# Patient Record
Sex: MALE | Race: WHITE | NOT HISPANIC OR LATINO | URBAN - METROPOLITAN AREA
[De-identification: names, ages, dates, MRNs, and addresses within clinical notes are randomized per-mention and may not be internally consistent; named-entity substitution may affect disease eponyms.]

---

## 2017-08-15 ENCOUNTER — APPOINTMENT (OUTPATIENT)
Dept: URBAN - METROPOLITAN AREA CLINIC 200 | Age: 42
Setting detail: DERMATOLOGY
End: 2017-08-15

## 2017-08-15 DIAGNOSIS — L0391 CELLULITIS AND ABSCESS OF UNSPECIFIED SITES: ICD-10-CM

## 2017-08-15 DIAGNOSIS — L0390 CELLULITIS AND ABSCESS OF UNSPECIFIED SITES: ICD-10-CM

## 2017-08-15 PROBLEM — L02.414 CUTANEOUS ABSCESS OF LEFT UPPER LIMB: Status: ACTIVE | Noted: 2017-08-15

## 2017-08-15 PROCEDURE — 10060 I&D ABSCESS SIMPLE/SINGLE: CPT

## 2017-08-15 PROCEDURE — OTHER INCISION AND DRAINAGE: OTHER

## 2017-08-15 PROCEDURE — OTHER ORDER TESTS: OTHER

## 2017-08-15 ASSESSMENT — LOCATION ZONE DERM: LOCATION ZONE: ARM

## 2017-08-15 ASSESSMENT — LOCATION DETAILED DESCRIPTION DERM: LOCATION DETAILED: LEFT VENTRAL PROXIMAL FOREARM

## 2017-08-15 ASSESSMENT — LOCATION SIMPLE DESCRIPTION DERM: LOCATION SIMPLE: LEFT FOREARM

## 2017-08-15 NOTE — PROCEDURE: INCISION AND DRAINAGE
Curette: No
Dressing: dry sterile dressing
Detail Level: Detailed
Suture Text: The incision was partially closed with
Anesthesia Volume In Cc: 0
Preparation Text: The area was prepped in the usual clean fashion.
Epidermal Closure: simple interrupted
Post-Care Instructions: I reviewed with the patient in detail post-care instructions. Patient should keep wound covered and call the office should any redness, pain, swelling or worsening occur.
Method: 15 blade
Consent was obtained and risks were reviewed including but not limited to delayed wound healing, infection, need for multiple I and D's, and pain.
Epidermal Sutures: 4-0 Ethilon
Lesion Type: Cyst

## 2017-08-17 ENCOUNTER — RX ONLY (RX ONLY)
Age: 42
End: 2017-08-17

## 2017-08-17 RX ORDER — DOXYCYCLINE HYCLATE 100 MG/1
CAPSULE, GELATIN COATED ORAL
Qty: 14 | Refills: 0 | Status: ERX | COMMUNITY
Start: 2017-08-17

## 2017-08-25 ENCOUNTER — RX ONLY (RX ONLY)
Age: 42
End: 2017-08-25

## 2017-08-25 RX ORDER — MUPIROCIN 20 MG/G
OINTMENT TOPICAL
Qty: 1 | Refills: 3 | Status: ERX | COMMUNITY
Start: 2017-08-25

## 2017-08-25 RX ORDER — CHLORHEXIDINE GLUCONATE 213 G/1000ML
SOLUTION TOPICAL
Qty: 1 | Refills: 2 | Status: ERX | COMMUNITY
Start: 2017-08-25

## 2018-01-22 ENCOUNTER — APPOINTMENT (OUTPATIENT)
Dept: URBAN - METROPOLITAN AREA CLINIC 200 | Age: 43
Setting detail: DERMATOLOGY
End: 2018-01-22

## 2018-01-22 DIAGNOSIS — L73.8 OTHER SPECIFIED FOLLICULAR DISORDERS: ICD-10-CM

## 2018-01-22 PROBLEM — D23.9 OTHER BENIGN NEOPLASM OF SKIN, UNSPECIFIED: Status: ACTIVE | Noted: 2018-01-22

## 2018-01-22 PROBLEM — D23.5 OTHER BENIGN NEOPLASM OF SKIN OF TRUNK: Status: ACTIVE | Noted: 2018-01-22

## 2018-01-22 PROCEDURE — OTHER MIPS QUALITY: OTHER

## 2018-01-22 PROCEDURE — OTHER REASSURANCE: OTHER

## 2018-01-22 PROCEDURE — OTHER RECOMMENDATIONS: OTHER

## 2018-01-22 PROCEDURE — 99213 OFFICE O/P EST LOW 20 MIN: CPT

## 2018-01-22 ASSESSMENT — LOCATION ZONE DERM: LOCATION ZONE: FACE

## 2018-01-22 ASSESSMENT — LOCATION DETAILED DESCRIPTION DERM: LOCATION DETAILED: LEFT INFERIOR CENTRAL MALAR CHEEK

## 2018-01-22 ASSESSMENT — LOCATION SIMPLE DESCRIPTION DERM: LOCATION SIMPLE: LEFT CHEEK

## 2018-01-22 NOTE — PROCEDURE: RECOMMENDATIONS
Recommendation Preamble: The following recommendations were made during the visit:
Detail Level: Zone
Recommendations (Free Text): Benzoyl peroxide panoxyl 4-5%

## 2018-09-26 ENCOUNTER — HOSPITAL ENCOUNTER (EMERGENCY)
Facility: HOSPITAL | Age: 43
Discharge: HOME | End: 2018-09-26
Attending: EMERGENCY MEDICINE
Payer: COMMERCIAL

## 2018-09-26 ENCOUNTER — APPOINTMENT (EMERGENCY)
Dept: RADIOLOGY | Facility: HOSPITAL | Age: 43
End: 2018-09-26
Attending: EMERGENCY MEDICINE
Payer: COMMERCIAL

## 2018-09-26 VITALS
HEART RATE: 73 BPM | RESPIRATION RATE: 18 BRPM | OXYGEN SATURATION: 99 % | TEMPERATURE: 99.9 F | HEIGHT: 70 IN | SYSTOLIC BLOOD PRESSURE: 102 MMHG | WEIGHT: 165 LBS | BODY MASS INDEX: 23.62 KG/M2 | DIASTOLIC BLOOD PRESSURE: 68 MMHG

## 2018-09-26 DIAGNOSIS — R94.31 PROLONGED Q-T INTERVAL ON ECG: ICD-10-CM

## 2018-09-26 DIAGNOSIS — R55 VASOVAGAL SYNCOPE: Primary | ICD-10-CM

## 2018-09-26 LAB
ALBUMIN SERPL-MCNC: 4.6 G/DL (ref 3.4–5)
ALP SERPL-CCNC: 42 IU/L (ref 35–126)
ALT SERPL-CCNC: 19 IU/L (ref 16–63)
ANION GAP SERPL CALC-SCNC: 12 MEQ/L (ref 3–15)
AST SERPL-CCNC: 28 IU/L (ref 15–41)
BASOPHILS # BLD: 0.03 K/UL (ref 0.01–0.1)
BASOPHILS NFR BLD: 0.2 %
BILIRUB SERPL-MCNC: 1.5 MG/DL (ref 0.3–1.2)
BUN SERPL-MCNC: 13 MG/DL (ref 8–20)
CALCIUM SERPL-MCNC: 8.5 MG/DL (ref 8.9–10.3)
CHLORIDE SERPL-SCNC: 104 MEQ/L (ref 98–109)
CO2 SERPL-SCNC: 19 MEQ/L (ref 22–32)
CREAT SERPL-MCNC: 0.8 MG/DL (ref 0.8–1.3)
DIFFERENTIAL METHOD BLD: ABNORMAL
EOSINOPHIL # BLD: 0.18 K/UL (ref 0.04–0.54)
EOSINOPHIL NFR BLD: 1.5 %
ERYTHROCYTE [DISTWIDTH] IN BLOOD BY AUTOMATED COUNT: 12.1 % (ref 11.6–14.4)
GFR SERPL CREATININE-BSD FRML MDRD: >60 ML/MIN/1.73M*2
GLUCOSE SERPL-MCNC: 146 MG/DL (ref 70–99)
HCT VFR BLDCO AUTO: 38.9 % (ref 40.1–51)
HGB BLD-MCNC: 13.7 G/DL (ref 13.7–17.5)
IMM GRANULOCYTES # BLD AUTO: 0.05 K/UL (ref 0–0.08)
IMM GRANULOCYTES NFR BLD AUTO: 0.4 %
LIPASE SERPL-CCNC: 24 U/L (ref 20–51)
LYMPHOCYTES # BLD: 2.56 K/UL (ref 1.2–3.5)
LYMPHOCYTES NFR BLD: 20.7 %
MCH RBC QN AUTO: 30.3 PG (ref 28–33.2)
MCHC RBC AUTO-ENTMCNC: 35.2 G/DL (ref 32.2–36.5)
MCV RBC AUTO: 86.1 FL (ref 83–98)
MONOCYTES # BLD: 1.1 K/UL (ref 0.3–1)
MONOCYTES NFR BLD: 8.9 %
NEUTROPHILS # BLD: 8.45 K/UL (ref 1.7–7)
NEUTS SEG NFR BLD: 68.3 %
NRBC BLD-RTO: 0 %
PDW BLD AUTO: 11.2 FL (ref 9.4–12.4)
PLATELET # BLD AUTO: 212 K/UL (ref 150–350)
POTASSIUM SERPL-SCNC: 3.1 MEQ/L (ref 3.6–5.1)
PROT SERPL-MCNC: 6.7 G/DL (ref 6–8.2)
RBC # BLD AUTO: 4.52 M/UL (ref 4.5–5.8)
SODIUM SERPL-SCNC: 135 MEQ/L (ref 136–144)
TROPONIN I SERPL-MCNC: <0.02 NG/ML
WBC # BLD AUTO: 12.37 K/UL (ref 3.8–10.5)

## 2018-09-26 PROCEDURE — 84484 ASSAY OF TROPONIN QUANT: CPT | Performed by: EMERGENCY MEDICINE

## 2018-09-26 PROCEDURE — 99284 EMERGENCY DEPT VISIT MOD MDM: CPT | Mod: 25

## 2018-09-26 PROCEDURE — 85025 COMPLETE CBC W/AUTO DIFF WBC: CPT | Performed by: EMERGENCY MEDICINE

## 2018-09-26 PROCEDURE — 70450 CT HEAD/BRAIN W/O DYE: CPT

## 2018-09-26 PROCEDURE — 83690 ASSAY OF LIPASE: CPT | Performed by: EMERGENCY MEDICINE

## 2018-09-26 PROCEDURE — 93005 ELECTROCARDIOGRAM TRACING: CPT | Performed by: EMERGENCY MEDICINE

## 2018-09-26 PROCEDURE — 80053 COMPREHEN METABOLIC PANEL: CPT | Performed by: EMERGENCY MEDICINE

## 2018-09-26 PROCEDURE — 36415 COLL VENOUS BLD VENIPUNCTURE: CPT | Performed by: EMERGENCY MEDICINE

## 2018-09-26 PROCEDURE — 71045 X-RAY EXAM CHEST 1 VIEW: CPT

## 2018-09-26 ASSESSMENT — ENCOUNTER SYMPTOMS
FEVER: 0
SHORTNESS OF BREATH: 0
SEIZURES: 0
ABDOMINAL PAIN: 0
COUGH: 0
SORE THROAT: 0
BACK PAIN: 0
COLOR CHANGE: 0
EYE PAIN: 0
HEMATURIA: 0
ARTHRALGIAS: 0
DYSURIA: 0
NAUSEA: 1
VOMITING: 1
CHILLS: 0
PALPITATIONS: 0

## 2018-09-27 LAB
ATRIAL RATE: 78
P AXIS: 69
PR INTERVAL: 140
QRS DURATION: 88
QT INTERVAL: 424
QTC CALCULATION(BAZETT): 483
R AXIS: 65
RAINBOW HOLD SPECIMEN: NORMAL
T WAVE AXIS: 31
VENTRICULAR RATE: 78

## 2018-09-27 NOTE — ED PROVIDER NOTES
HPI     Chief Complaint   Patient presents with   • Syncope       HPI  43-year-old male with history of anxiety presenting with syncope.    States that prior to arrival he got emotional news from his father and started to feel sick.  He went outside thinking he was going to throw up and then remembers waking up on the ground looking up.  States he fell onto the grass might have hit a post.  After he woke up he started vomiting multiple times.  Currently states he is feeling a little better but has mild abdominal pain because of the vomiting and mild left neck soreness.  Denies family history of sudden cardiac death.  Notes that he did have a syncopal episode approximately 12 years ago after experiencing intense back pain.  Denies headache, chest pain, shortness of breath.  Given 8 mg of Zofran with EMS.   Patient History     Past Medical History:   Diagnosis Date   • Anxiety    • Depression        History reviewed. No pertinent surgical history.    History reviewed. No pertinent family history.    Social History   Substance Use Topics   • Smoking status: Never Smoker   • Smokeless tobacco: Not on file   • Alcohol use Yes      Comment: occasional       Systems Reviewed from Nursing Triage:          Review of Systems     Review of Systems   Constitutional: Negative for chills and fever.   HENT: Negative for ear pain and sore throat.    Eyes: Negative for pain and visual disturbance.   Respiratory: Negative for cough and shortness of breath.    Cardiovascular: Negative for chest pain and palpitations.   Gastrointestinal: Positive for nausea and vomiting. Negative for abdominal pain.   Genitourinary: Negative for dysuria and hematuria.   Musculoskeletal: Negative for arthralgias and back pain.   Skin: Negative for color change and rash.   Neurological: Positive for syncope. Negative for seizures.   All other systems reviewed and are negative.       Physical Exam     ED Triage Vitals   Temp Heart Rate Resp BP SpO2  "  09/26/18 1946 09/26/18 1946 09/26/18 1946 09/26/18 1946 09/26/18 1946   36.8 °C (98.2 °F) 68 18 (!) 147/86 100 %      Temp Source Heart Rate Source Patient Position BP Location FiO2 (%) (Set)   09/26/18 1946 -- 09/26/18 2058 09/26/18 2058 --   Temporal  Lying Right upper arm                      Patient Vitals for the past 24 hrs:   BP Temp Temp src Pulse Resp SpO2 Height Weight   09/26/18 2159 102/68 37.7 °C (99.9 °F) Temporal 73 18 99 % - -   09/26/18 2058 116/70 - - 70 16 100 % - -   09/26/18 1946 (!) 147/86 36.8 °C (98.2 °F) Temporal 68 18 100 % 1.778 m (5' 10\") 74.8 kg (165 lb)           Physical Exam   Constitutional: He appears well-developed and well-nourished.   HENT:   Head: Normocephalic and atraumatic.   Eyes: Conjunctivae are normal.   Neck: Normal range of motion. Neck supple.   Mild left sided cervical ttp. No midline spinal tenderness.    Cardiovascular: Normal rate and regular rhythm.    No murmur heard.  Pulmonary/Chest: Effort normal and breath sounds normal. No respiratory distress.   Abdominal: Soft. There is tenderness.   Musculoskeletal: He exhibits no edema.   Neurological: He is alert.   Skin: Skin is warm and dry.   Psychiatric:   Odd affect   Nursing note and vitals reviewed.           Procedures    ED Course & MDM     Labs Reviewed   COMPREHENSIVE METABOLIC PANEL - Abnormal        Result Value    Sodium 135 (*)     Potassium 3.1 (*)     Chloride 104      CO2 19 (*)     BUN 13      Creatinine 0.8      Glucose 146 (*)     Calcium 8.5 (*)     AST (SGOT) 28      ALT (SGPT) 19      Alkaline Phosphatase 42      Total Protein 6.7      Albumin 4.6      Bilirubin, Total 1.5 (*)     eGFR >60.0      Anion Gap 12      Narrative:     Order only if pain is above umbilicus   CBC - Abnormal     WBC 12.37 (*)     RBC 4.52      Hemoglobin 13.7      Hematocrit 38.9 (*)     MCV 86.1      MCH 30.3      MCHC 35.2      RDW 12.1      Platelets 212      MPV 11.2     DIFF COUNT - Abnormal     Differential Type " Auto      nRBC 0.0      Immature Granulocytes 0.4      Neutrophils 68.3      Lymphocytes 20.7      Monocytes 8.9      Eosinophils 1.5      Basophils 0.2      Immature Granulocytes, Absolute 0.05      Neutrophils, Absolute 8.45 (*)     Lymphocytes, Absolute 2.56      Monocytes, Absolute 1.10 (*)     Eosinophils, Absolute 0.18      Basophils, Absolute 0.03     TROPONIN I - Normal    Troponin I <0.02     LIPASE - Normal    Lipase 24      Narrative:     Order only if pain is above umbilicus   CBC AND DIFFERENTIAL    Narrative:     The following orders were created for panel order CBC and differential.  Procedure                               Abnormality         Status                     ---------                               -----------         ------                     CBC[[id number]]                           Abnormal            Final result               Diff Count[[id number]]                    Abnormal            Final result                 Please view results for these tests on the individual orders.   RAINBOW DRAW PANEL    Narrative:     The following orders were created for panel order [facility] Draw Panel.  Procedure                               Abnormality         Status                     ---------                               -----------         ------                     RAINBOW RED[[id number]]                                       In process                 RAINBOW LT BLUE[[id number]]                                   In process                 RAINBOW GOLD[[id number]]                                      In process                   Please view results for these tests on the individual orders.   RAINBOW RED   RAINBOW LT BLUE   RAINBOW GOLD       CT HEAD WITHOUT IV CONTRAST   Final Result   IMPRESSION: No acute hemorrhage. No acute infarction in a major vascular   territory. No mass or mass effect.                  ECG 12 lead    (Results Pending)   X-RAY CHEST 1 VIEW    (Results Pending)            MDM  Syncope.  Sounds vasovagal based on story.  No midline spinal tenderness.  Even odd affect will CT head but low suspicion for intracranial process.  Labs ordered by intake nurse.  ECG without MO prolongation.  Mild QTC prolongation but given large dose of Zofran with EMS.  Will rehydrate, check labs, CT head, and reevaluate.         ED Course as of Sep 26 2207   Wed Sep 26, 2018   2121 IMPRESSION: No acute hemorrhage. No acute infarction in a major vascular  territory. No mass or mass effect. CT HEAD WITHOUT IV CONTRAST [JK]   2128 Lipase: 24 [JK]   2129 Troponin I: <0.02 [JK]   2206 Pt feeling better after fluids.  CXR with no acute disease.  Will d/c home with PCP f/u of prolonged QTc.   [JK]      ED Course User Index  [JK] Vel Byrd MD         Clinical Impressions as of Sep 26 2207   Vasovagal syncope   Prolonged Q-T interval on ECG        Vel Byrd MD  Resident  09/26/18 2207

## 2018-09-27 NOTE — DISCHARGE INSTRUCTIONS
Return if worsening or concerning symptoms.  Be sure to drink plenty of water during the day.  Please follow-up with your primary care physician.  Also, your QTc was 483 (your primary care doctor will know what this means). Please get a repeat ECG when you see your primary care doctor.

## 2018-09-27 NOTE — ED ATTESTATION NOTE
I saw and evaluated the patient.  I reviewed the resident's note and agree with the findings and plan as documented in the note except for my comments if noted below.    C/o syncope just pta. Pt says he was in his usual state of health, he got some bad news from his father and suddenly felt nauseated, and dizzy. Walked outside and passed out. Fell approx 2 steps off his deck. He thinks he might have passed out for 5 seconds. He has some mild left sided neck pain and mild left ear pain. He vomited several times after passing out with mild abd cramping. There was no assoc cp, sob, cough, headache, diarrhea, focal weakness, leg pain or swelling. He denies chronic medical problems. No daily meds except magnesium. No smoking or heavy etoh. He does smoke marijuana. No recent travel or surgeyr. No h/o mi, pe or dvt.     PE - NAD  NCAT  Neck - no swelling, no midline ttp. Full rom with central pain. Mild lateral pain on rom. No bruit  Cards - rrr no m/r/g  Lungs - cta bilat, symmetric, nonlabored  abd - soft nontender.   Ext - no deformity or ttp, neg homans.   Neuro - nonfocal     A/P - likely vasovagal syncope. No major signs of injury. ekg with slightly prolonged qtc but < 500.           I was physically present for the key/critical portions of the following procedures: None     Bjorn Babb MD  09/26/18 6044

## 2019-02-08 ENCOUNTER — APPOINTMENT (OUTPATIENT)
Dept: URBAN - METROPOLITAN AREA CLINIC 200 | Age: 44
Setting detail: DERMATOLOGY
End: 2019-02-14

## 2019-02-08 DIAGNOSIS — L57.8 OTHER SKIN CHANGES DUE TO CHRONIC EXPOSURE TO NONIONIZING RADIATION: ICD-10-CM

## 2019-02-08 PROBLEM — D23.5 OTHER BENIGN NEOPLASM OF SKIN OF TRUNK: Status: ACTIVE | Noted: 2019-02-08

## 2019-02-08 PROCEDURE — OTHER MIPS QUALITY: OTHER

## 2019-02-08 PROCEDURE — 99213 OFFICE O/P EST LOW 20 MIN: CPT

## 2019-02-08 PROCEDURE — OTHER COUNSELING: OTHER

## 2019-02-08 ASSESSMENT — LOCATION ZONE DERM: LOCATION ZONE: TRUNK

## 2019-02-08 ASSESSMENT — LOCATION DETAILED DESCRIPTION DERM: LOCATION DETAILED: LEFT MEDIAL SUPERIOR CHEST

## 2019-02-08 ASSESSMENT — LOCATION SIMPLE DESCRIPTION DERM: LOCATION SIMPLE: CHEST

## 2019-10-21 ENCOUNTER — RX ONLY (RX ONLY)
Age: 44
End: 2019-10-21

## 2019-10-21 RX ORDER — DOXYCYCLINE HYCLATE 100 MG/1
CAPSULE, GELATIN COATED ORAL
Qty: 42 | Refills: 0 | Status: ERX | COMMUNITY
Start: 2019-10-21

## 2020-03-06 ENCOUNTER — APPOINTMENT (OUTPATIENT)
Dept: URBAN - METROPOLITAN AREA CLINIC 200 | Age: 45
Setting detail: DERMATOLOGY
End: 2020-03-11

## 2020-03-06 DIAGNOSIS — L57.8 OTHER SKIN CHANGES DUE TO CHRONIC EXPOSURE TO NONIONIZING RADIATION: ICD-10-CM

## 2020-03-06 DIAGNOSIS — L70.0 ACNE VULGARIS: ICD-10-CM

## 2020-03-06 DIAGNOSIS — D18.0 HEMANGIOMA: ICD-10-CM

## 2020-03-06 PROBLEM — D23.5 OTHER BENIGN NEOPLASM OF SKIN OF TRUNK: Status: ACTIVE | Noted: 2020-03-06

## 2020-03-06 PROBLEM — D18.01 HEMANGIOMA OF SKIN AND SUBCUTANEOUS TISSUE: Status: ACTIVE | Noted: 2020-03-06

## 2020-03-06 PROCEDURE — 99213 OFFICE O/P EST LOW 20 MIN: CPT

## 2020-03-06 PROCEDURE — OTHER COUNSELING: OTHER

## 2020-03-06 PROCEDURE — OTHER PRESCRIPTION: OTHER

## 2020-03-06 RX ORDER — AZELAIC ACID 0.15 G/G
GEL TOPICAL
Qty: 1 | Refills: 6 | Status: ERX | COMMUNITY
Start: 2020-03-06

## 2020-03-06 ASSESSMENT — LOCATION DETAILED DESCRIPTION DERM
LOCATION DETAILED: LEFT MEDIAL SUPERIOR CHEST
LOCATION DETAILED: RIGHT SUPERIOR PARIETAL SCALP
LOCATION DETAILED: LEFT SUPERIOR MEDIAL BUCCAL CHEEK

## 2020-03-06 ASSESSMENT — LOCATION ZONE DERM
LOCATION ZONE: TRUNK
LOCATION ZONE: SCALP
LOCATION ZONE: FACE

## 2020-03-06 ASSESSMENT — LOCATION SIMPLE DESCRIPTION DERM
LOCATION SIMPLE: LEFT CHEEK
LOCATION SIMPLE: CHEST
LOCATION SIMPLE: SCALP

## 2021-04-07 ENCOUNTER — APPOINTMENT (OUTPATIENT)
Dept: URBAN - METROPOLITAN AREA CLINIC 200 | Age: 46
Setting detail: DERMATOLOGY
End: 2021-04-08

## 2021-04-07 ENCOUNTER — RX ONLY (RX ONLY)
Age: 46
End: 2021-04-07

## 2021-04-07 DIAGNOSIS — L71.8 OTHER ROSACEA: ICD-10-CM

## 2021-04-07 DIAGNOSIS — L57.8 OTHER SKIN CHANGES DUE TO CHRONIC EXPOSURE TO NONIONIZING RADIATION: ICD-10-CM

## 2021-04-07 DIAGNOSIS — L82.1 OTHER SEBORRHEIC KERATOSIS: ICD-10-CM

## 2021-04-07 PROCEDURE — OTHER COUNSELING: OTHER

## 2021-04-07 PROCEDURE — 99213 OFFICE O/P EST LOW 20 MIN: CPT

## 2021-04-07 PROCEDURE — OTHER REASSURANCE: OTHER

## 2021-04-07 PROCEDURE — OTHER PRESCRIPTION MEDICATION MANAGEMENT: OTHER

## 2021-04-07 RX ORDER — AZELAIC ACID 0.15 G/G
GEL TOPICAL
Qty: 1 | Refills: 6 | Status: ERX

## 2021-04-07 ASSESSMENT — LOCATION DETAILED DESCRIPTION DERM
LOCATION DETAILED: RIGHT CENTRAL MALAR CHEEK
LOCATION DETAILED: LEFT DISTAL DORSAL FOREARM
LOCATION DETAILED: LEFT INFERIOR CENTRAL MALAR CHEEK
LOCATION DETAILED: LEFT MEDIAL SUPERIOR CHEST

## 2021-04-07 ASSESSMENT — LOCATION ZONE DERM
LOCATION ZONE: ARM
LOCATION ZONE: TRUNK
LOCATION ZONE: FACE

## 2021-04-07 ASSESSMENT — LOCATION SIMPLE DESCRIPTION DERM
LOCATION SIMPLE: LEFT FOREARM
LOCATION SIMPLE: CHEST
LOCATION SIMPLE: LEFT CHEEK
LOCATION SIMPLE: RIGHT CHEEK

## 2021-12-19 ENCOUNTER — RX ONLY (RX ONLY)
Age: 46
End: 2021-12-19

## 2021-12-19 RX ORDER — DOXYCYCLINE HYCLATE 100 MG/1
TABLET, COATED ORAL
Qty: 2 | Refills: 1 | Status: ERX | COMMUNITY
Start: 2021-12-19

## 2022-06-17 ENCOUNTER — APPOINTMENT (OUTPATIENT)
Dept: URBAN - METROPOLITAN AREA CLINIC 200 | Age: 47
Setting detail: DERMATOLOGY
End: 2022-06-20

## 2022-06-17 DIAGNOSIS — Z11.52 ENCOUNTER FOR SCREENING FOR COVID-19: ICD-10-CM

## 2022-06-17 DIAGNOSIS — L57.8 OTHER SKIN CHANGES DUE TO CHRONIC EXPOSURE TO NONIONIZING RADIATION: ICD-10-CM

## 2022-06-17 DIAGNOSIS — L57.0 ACTINIC KERATOSIS: ICD-10-CM

## 2022-06-17 PROCEDURE — OTHER COUNSELING: OTHER

## 2022-06-17 PROCEDURE — 99213 OFFICE O/P EST LOW 20 MIN: CPT | Mod: 25

## 2022-06-17 PROCEDURE — OTHER SCREENING FOR COVID-19: OTHER

## 2022-06-17 PROCEDURE — OTHER LIQUID NITROGEN: OTHER

## 2022-06-17 PROCEDURE — OTHER SUNSCREEN RECOMMENDATIONS: OTHER

## 2022-06-17 PROCEDURE — 17000 DESTRUCT PREMALG LESION: CPT

## 2022-06-17 ASSESSMENT — LOCATION DETAILED DESCRIPTION DERM
LOCATION DETAILED: LEFT MEDIAL INFERIOR CHEST
LOCATION DETAILED: EPIGASTRIC SKIN
LOCATION DETAILED: LEFT SUPERIOR FOREHEAD

## 2022-06-17 ASSESSMENT — LOCATION SIMPLE DESCRIPTION DERM
LOCATION SIMPLE: CHEST
LOCATION SIMPLE: LEFT FOREHEAD
LOCATION SIMPLE: ABDOMEN

## 2022-06-17 ASSESSMENT — LOCATION ZONE DERM
LOCATION ZONE: TRUNK
LOCATION ZONE: FACE

## 2022-06-17 ASSESSMENT — PAIN INTENSITY VAS: HOW INTENSE IS YOUR PAIN 0 BEING NO PAIN, 10 BEING THE MOST SEVERE PAIN POSSIBLE?: NO PAIN

## 2022-06-17 NOTE — PROCEDURE: LIQUID NITROGEN
Consent: The patient's consent was obtained including but not limited to risks of crusting, scabbing, blistering, scarring, darker or lighter pigmentary change, recurrence, incomplete removal and infection.
Show Aperture Variable?: Yes
Detail Level: Detailed
Render Note In Bullet Format When Appropriate: No
Duration Of Freeze Thaw-Cycle (Seconds): 3
Number Of Freeze-Thaw Cycles: 1 freeze-thaw cycle
Post-Care Instructions: I reviewed with the patient in detail post-care instructions. Patient is to wear sunprotection, and avoid picking at any of the treated lesions. Pt may apply Vaseline to crusted or scabbing areas.
Application Tool (Optional): Liquid Nitrogen Sprayer

## 2022-06-17 NOTE — PROCEDURE: SCREENING FOR COVID-19
Detail Level: Simple
Previous Infection Text: The patient has recovered from a previous COVID-19 infection.
Has The Patient Been Vaccinated For Covid-19?: Yes
Has The Patient Been Infected With Covid-19 In The Past?: No

## 2022-08-25 ENCOUNTER — HOSPITAL ENCOUNTER (EMERGENCY)
Facility: HOSPITAL | Age: 47
Discharge: HOME | End: 2022-08-25
Attending: EMERGENCY MEDICINE
Payer: COMMERCIAL

## 2022-08-25 ENCOUNTER — APPOINTMENT (EMERGENCY)
Dept: RADIOLOGY | Facility: HOSPITAL | Age: 47
End: 2022-08-25
Attending: EMERGENCY MEDICINE
Payer: COMMERCIAL

## 2022-08-25 VITALS
BODY MASS INDEX: 23.62 KG/M2 | HEIGHT: 70 IN | OXYGEN SATURATION: 99 % | DIASTOLIC BLOOD PRESSURE: 102 MMHG | HEART RATE: 72 BPM | RESPIRATION RATE: 18 BRPM | SYSTOLIC BLOOD PRESSURE: 156 MMHG | TEMPERATURE: 97.9 F | WEIGHT: 165 LBS

## 2022-08-25 DIAGNOSIS — S61.111A LACERATION OF RIGHT THUMB WITH DAMAGE TO NAIL, FOREIGN BODY PRESENCE UNSPECIFIED, INITIAL ENCOUNTER: Primary | ICD-10-CM

## 2022-08-25 DIAGNOSIS — S62.523A CLOSED FRACTURE OF TUFT OF DISTAL PHALANX OF THUMB: ICD-10-CM

## 2022-08-25 PROCEDURE — 99284 EMERGENCY DEPT VISIT MOD MDM: CPT | Mod: 25

## 2022-08-25 PROCEDURE — 73140 X-RAY EXAM OF FINGER(S): CPT | Mod: RT

## 2022-08-25 PROCEDURE — 11730 AVULSION NAIL PLATE SIMPLE 1: CPT

## 2022-08-25 PROCEDURE — 90715 TDAP VACCINE 7 YRS/> IM: CPT

## 2022-08-25 PROCEDURE — 0HQFXZZ REPAIR RIGHT HAND SKIN, EXTERNAL APPROACH: ICD-10-PCS | Performed by: EMERGENCY MEDICINE

## 2022-08-25 PROCEDURE — 90471 IMMUNIZATION ADMIN: CPT

## 2022-08-25 PROCEDURE — 63600000 HC DRUGS/DETAIL CODE

## 2022-08-25 PROCEDURE — 25000000 HC PHARMACY GENERAL

## 2022-08-25 PROCEDURE — 12001 RPR S/N/AX/GEN/TRNK 2.5CM/<: CPT

## 2022-08-25 PROCEDURE — 96365 THER/PROPH/DIAG IV INF INIT: CPT | Mod: 59

## 2022-08-25 RX ORDER — CLINDAMYCIN HYDROCHLORIDE 300 MG/1
300 CAPSULE ORAL 4 TIMES DAILY
Qty: 28 CAPSULE | Refills: 0 | Status: SHIPPED | OUTPATIENT
Start: 2022-08-25 | End: 2022-09-01

## 2022-08-25 RX ORDER — MELOXICAM 7.5 MG/1
15 TABLET ORAL DAILY PRN
COMMUNITY

## 2022-08-25 RX ORDER — CLINDAMYCIN PHOSPHATE 900 MG/50ML
900 INJECTION, SOLUTION INTRAVENOUS ONCE
Status: COMPLETED | OUTPATIENT
Start: 2022-08-25 | End: 2022-08-25

## 2022-08-25 RX ADMIN — CLINDAMYCIN PHOSPHATE 900 MG: 900 INJECTION, SOLUTION INTRAVENOUS at 17:42

## 2022-08-25 RX ADMIN — TETANUS TOXOID, REDUCED DIPHTHERIA TOXOID AND ACELLULAR PERTUSSIS VACCINE, ADSORBED 0.5 ML: 5; 2.5; 8; 8; 2.5 SUSPENSION INTRAMUSCULAR at 18:29

## 2022-08-25 ASSESSMENT — ENCOUNTER SYMPTOMS
ARTHRALGIAS: 0
WOUND: 1
JOINT SWELLING: 0

## 2022-08-25 NOTE — ED PROVIDER NOTES
Emergency Medicine Note  HPI   HISTORY OF PRESENT ILLNESS       History provided by:  Patient and medical records   used: No      48 y/o RHD M presents to the ED via private vehicle for evaluation of right thumb laceration.  Patient was using a jig saw and cut the nail of his right thumb. Last tetanus 2016. Denies decreased ROM or joint swelling. No thinners.      Patient History   PAST HISTORY     Reviewed from Nursing Triage:         Past Medical History:   Diagnosis Date    Anxiety     Depression        History reviewed. No pertinent surgical history.    History reviewed. No pertinent family history.    Social History     Tobacco Use    Smoking status: Never Smoker   Substance Use Topics    Alcohol use: Yes     Comment: occasional    Drug use: Yes     Types: Marijuana     Comment: daily         Review of Systems   REVIEW OF SYSTEMS     Review of Systems   Musculoskeletal: Negative for arthralgias and joint swelling.   Skin: Positive for wound.         VITALS     ED Vitals    Date/Time Temp Pulse Resp BP SpO2 [facility]   08/25/22 1609 36.6 °C (97.9 °F) 72 18 156/102 99 % [facility]        Pulse Ox %: 99 % (08/25/22 1650)  Pulse Ox Interpretation: Normal (08/25/22 1650)  Heart Rate: 72 (08/25/22 1650)        Physical Exam   PHYSICAL EXAM     Physical Exam  Vitals and nursing note reviewed.   Constitutional:       General: He is not in acute distress.     Appearance: He is well-developed.   HENT:      Head: Atraumatic.   Eyes:      Conjunctiva/sclera: Conjunctivae normal.   Pulmonary:      Effort: Pulmonary effort is normal.   Musculoskeletal:         General: No deformity.      Comments: R thumb ROM intact. TTP over R nail and nail bed. No significant swelling. No deformity.   Skin:     General: Skin is warm and dry.      Capillary Refill: Capillary refill takes less than 2 seconds.      Comments: Multiple jagged lacerations to nail of R thumb with damage to nail bed extending entire length of the nail.  Actively bleeding. Nail feels loose. No lacerations visible on skin surrounding nail.    Neurological:      General: No focal deficit present.      Mental Status: He is alert and oriented to person, place, and time.      Comments: Distal NV intact   Psychiatric:         Mood and Affect: Mood normal.         Behavior: Behavior normal.           PROCEDURES     Nail Removal    Date/Time: 8/25/2022 7:45 PM  Performed by: Sandra Goetz PA C  Authorized by: Godshall, Duane K, MD     Consent:     Consent obtained:  Verbal    Consent given by:  Patient    Risks, benefits, and alternatives were discussed: yes      Risks discussed:  Bleeding, incomplete removal, infection, pain and permanent nail deformity    Alternatives discussed:  No treatment  Universal protocol:     Procedure explained and questions answered to patient or proxy's satisfaction: yes      Imaging studies available: yes      Patient identity confirmed:  Verbally with patient and arm band  Location:     Hand:  R thumb  Pre-procedure details:     Skin preparation:  Povidone-iodine    Preparation: Patient was prepped and draped in the usual sterile fashion    Anesthesia:     Anesthesia method:  Nerve block    Block location:  R thumb digital block    Block needle gauge:  25 G    Block anesthetic:  Lidocaine 1% w/o epi    Block technique:  Finger web space    Block injection procedure:  Anatomic landmarks identified, anatomic landmarks palpated, introduced needle, negative aspiration for blood and incremental injection    Block outcome:  Anesthesia achieved  Nail Removal:     Nail removed:  Complete    Nail bed repaired: no    Post-procedure details:     Dressing:  4x4 sterile gauze and gauze roll (adaptic)    Procedure completion:  Tolerated well, no immediate complications         DATA     Results     None          Imaging Results          X-RAY FINGER RIGHT 2+ VIEWS (Final result)  Result time 08/25/22 17:41:58    Final result                 Impression:     IMPRESSION: Comminuted nondisplaced fracture of the tuft of the right first  distal phalanx.             Narrative:    CLINICAL HISTORY: Right thumb injury with a saw.    COMMENT: 4 views of the right first finger were obtained. No prior studies for  comparison.    Markedly comminuted nondisplaced fracture involving the tuft of the right first  distal phalanx.                                No orders to display       Scoring tools                                  ED Course & MDM   MDM / ED COURSE / CLINICAL IMPRESSION / DISPO     MDM    ED Course as of 08/25/22 1947   Thu Aug 25, 2022   1659 Impression: R thumb nail laceration    Plan: X-ray, remove nail, hand f/u [SG]   1718 Ortho resident recs remove nail, suture nail bed if possible, if not then use dermabond and wrap with adaptic and gauze. F/u hand [SG]   1746 X-RAY FINGER RIGHT 2+ VIEWS  IMPRESSION: Comminuted nondisplaced fracture of the tuft of the right first distal phalanx [SG]   1841 Nail removed revealing macerated tissue. No clean lacerations to suture. Adaptic applied. 4x4 and kerlix gauze. Splint placed for immobility [SG]      ED Course User Index  [SG] Sandra Goetz PA C     Clinical Impression      Laceration of right thumb with damage to nail, foreign body presence unspecified, initial encounter   Closed fracture of tuft of distal phalanx of thumb     Disposition  Discharge         Sandra Goetz PA C  08/25/22 1124

## 2022-08-25 NOTE — ED ATTESTATION NOTE
The patient was evaluated and managed by the PA/NP/resident.  I have discussed the case with the PA/NP/resident and I have seen and examined the patients thumb.  I am in agreement with the ED workup and treatment plan.  I will continue to be available for consultation as needed.     Godshall, Duane K, MD  08/25/22 1771

## 2022-08-25 NOTE — DISCHARGE INSTRUCTIONS
As discussed, please call your PCP Dr. Izaguirre today to inform them of your ER visit and arrange a follow-up appointment within the next 2-3 days. Please return to the ED for any worsening, new, or concerning symptoms such as fever, chest pain, shortness of breath, abdominal pain, vomiting, etc.    Please follow up with a hand surgeon. Call the office to schedule an appointment. Please keep your dressing clean and dry. If you need to replace your dressing, please place another nonadherent (adaptic, 4x4 gauze, roll gauze) dressing. Please keep the splint on at all times.    Please take your antibiotic, Clindamycin - 1 capsule by mouth 4 times a day for 7 days.    You may take 600 mg ibuprofen (Advil/Motrin) every 6 hours as needed for pain. You may take 650 mg acetaminophen (Tylenol) every 6 hours as needed for pain. You may alternate the two every 3 hours for optimal pain control. Please keep track of how much acetaminophen you consume, for you should not receive more than 3 g acetaminophen in a 24 hour period. Please drink plenty of fluids to stay hydrated.

## 2022-09-21 ENCOUNTER — RX ONLY (RX ONLY)
Age: 47
End: 2022-09-21

## 2022-09-21 RX ORDER — AZELAIC ACID 0.15 G/G
GEL TOPICAL
Qty: 50 | Refills: 3 | Status: ERX

## 2022-11-07 ENCOUNTER — RX ONLY (RX ONLY)
Age: 47
End: 2022-11-07

## 2022-11-07 RX ORDER — DOXYCYCLINE HYCLATE 100 MG/1
100MG TABLET, COATED ORAL
Qty: 2 | Refills: 1 | Status: ERX | COMMUNITY
Start: 2022-11-07

## 2023-06-21 ENCOUNTER — APPOINTMENT (OUTPATIENT)
Dept: URBAN - METROPOLITAN AREA CLINIC 203 | Age: 48
Setting detail: DERMATOLOGY
End: 2023-06-29

## 2023-06-21 DIAGNOSIS — L57.8 OTHER SKIN CHANGES DUE TO CHRONIC EXPOSURE TO NONIONIZING RADIATION: ICD-10-CM

## 2023-06-21 PROCEDURE — OTHER COUNSELING: OTHER

## 2023-06-21 PROCEDURE — 99213 OFFICE O/P EST LOW 20 MIN: CPT

## 2023-06-21 PROCEDURE — OTHER SUNSCREEN RECOMMENDATIONS: OTHER

## 2023-06-21 ASSESSMENT — LOCATION ZONE DERM: LOCATION ZONE: TRUNK

## 2023-06-21 ASSESSMENT — LOCATION DETAILED DESCRIPTION DERM: LOCATION DETAILED: LEFT MEDIAL INFERIOR CHEST

## 2023-06-21 ASSESSMENT — LOCATION SIMPLE DESCRIPTION DERM: LOCATION SIMPLE: CHEST

## 2024-04-08 ENCOUNTER — APPOINTMENT (OUTPATIENT)
Dept: URBAN - METROPOLITAN AREA CLINIC 203 | Age: 49
Setting detail: DERMATOLOGY
End: 2024-04-15

## 2024-04-08 DIAGNOSIS — D22 MELANOCYTIC NEVI: ICD-10-CM

## 2024-04-08 DIAGNOSIS — L82.1 OTHER SEBORRHEIC KERATOSIS: ICD-10-CM

## 2024-04-08 DIAGNOSIS — L57.8 OTHER SKIN CHANGES DUE TO CHRONIC EXPOSURE TO NONIONIZING RADIATION: ICD-10-CM

## 2024-04-08 DIAGNOSIS — L30.9 DERMATITIS, UNSPECIFIED: ICD-10-CM

## 2024-04-08 PROBLEM — D22.5 MELANOCYTIC NEVI OF TRUNK: Status: ACTIVE | Noted: 2024-04-08

## 2024-04-08 PROCEDURE — OTHER SUNSCREEN RECOMMENDATIONS: OTHER

## 2024-04-08 PROCEDURE — OTHER COUNSELING: OTHER

## 2024-04-08 PROCEDURE — OTHER ORDER TESTS: OTHER

## 2024-04-08 PROCEDURE — 99213 OFFICE O/P EST LOW 20 MIN: CPT

## 2024-04-08 PROCEDURE — OTHER REASSURANCE: OTHER

## 2024-04-08 ASSESSMENT — LOCATION DETAILED DESCRIPTION DERM
LOCATION DETAILED: RIGHT NARIS
LOCATION DETAILED: LEFT MEDIAL UPPER BACK
LOCATION DETAILED: LEFT INFERIOR MEDIAL UPPER BACK
LOCATION DETAILED: LEFT MEDIAL INFERIOR CHEST

## 2024-04-08 ASSESSMENT — LOCATION SIMPLE DESCRIPTION DERM
LOCATION SIMPLE: RIGHT NOSE
LOCATION SIMPLE: CHEST
LOCATION SIMPLE: LEFT UPPER BACK

## 2024-04-08 ASSESSMENT — LOCATION ZONE DERM
LOCATION ZONE: NOSE
LOCATION ZONE: TRUNK

## 2024-04-08 NOTE — PROCEDURE: ORDER TESTS
Performing Laboratory: [id number]
Billing Type: Third-Party Bill
Bill For Surgical Tray: no
Lab Facility: [id number]
Expected Date Of Service: 04/08/2024

## 2024-05-07 ENCOUNTER — RX ONLY (RX ONLY)
Age: 49
End: 2024-05-07

## 2024-05-07 RX ORDER — MUPIROCIN 20 MG/G
OINTMENT TOPICAL
Qty: 22 | Refills: 6 | Status: ERX | COMMUNITY
Start: 2024-05-07